# Patient Record
Sex: MALE | URBAN - NONMETROPOLITAN AREA
[De-identification: names, ages, dates, MRNs, and addresses within clinical notes are randomized per-mention and may not be internally consistent; named-entity substitution may affect disease eponyms.]

---

## 2024-09-27 ENCOUNTER — APPOINTMENT (RX ONLY)
Dept: URBAN - NONMETROPOLITAN AREA CLINIC 9 | Facility: CLINIC | Age: 34
Setting detail: DERMATOLOGY
End: 2024-09-27

## 2024-09-27 DIAGNOSIS — L81.4 OTHER MELANIN HYPERPIGMENTATION: ICD-10-CM

## 2024-09-27 PROCEDURE — ? INVENTORY

## 2024-09-27 PROCEDURE — ? TREATMENT REGIMEN

## 2024-09-27 PROCEDURE — ? COUNSELING

## 2024-09-27 PROCEDURE — 99202 OFFICE O/P NEW SF 15 MIN: CPT

## 2024-09-27 NOTE — PROCEDURE: TREATMENT REGIMEN
Detail Level: Zone
Plan: Discussed in detail our preferred alternatives to some of the products he is currently using, but most importantly making sure his sunscreen has the proper active ingredients and starting Melafade. Recommended Dove for Sensitive Skin for daily cleansing and stopping the exfoliant.

## 2024-09-27 NOTE — HPI: RASH
What Type Of Note Output Would You Prefer (Optional)?: Standard Output
Is This A New Presentation, Or A Follow-Up?: Rash
Additional History: He has some different products including Vitamin C, Squalene, Retinol, Anti-Aging, Exfoliant, Sunscreen. He admits he isn’t consistent with using them. He would like to know if these products are even appropriate or alternatives if they aren’t.